# Patient Record
Sex: MALE | Race: WHITE
[De-identification: names, ages, dates, MRNs, and addresses within clinical notes are randomized per-mention and may not be internally consistent; named-entity substitution may affect disease eponyms.]

---

## 2019-01-24 ENCOUNTER — HOSPITAL ENCOUNTER (INPATIENT)
Dept: HOSPITAL 62 - ER | Age: 74
LOS: 4 days | Discharge: HOME | DRG: 336 | End: 2019-01-28
Attending: SURGERY | Admitting: SURGERY
Payer: MEDICARE

## 2019-01-24 DIAGNOSIS — K56.699: Primary | ICD-10-CM

## 2019-01-24 DIAGNOSIS — K76.89: ICD-10-CM

## 2019-01-24 DIAGNOSIS — Z87.891: ICD-10-CM

## 2019-01-24 DIAGNOSIS — K80.20: ICD-10-CM

## 2019-01-24 DIAGNOSIS — Q43.3: ICD-10-CM

## 2019-01-24 DIAGNOSIS — I25.10: ICD-10-CM

## 2019-01-24 DIAGNOSIS — I10: ICD-10-CM

## 2019-01-24 DIAGNOSIS — K57.92: ICD-10-CM

## 2019-01-24 LAB
ABSOLUTE LYMPHOCYTES# (MANUAL): 0.5 10^3/UL (ref 0.5–4.7)
ABSOLUTE MONOCYTES # (MANUAL): 1.5 10^3/UL (ref 0.1–1.4)
ABSOLUTE NEUTROPHILS# (MANUAL): 11.5 10^3/UL (ref 1.7–8.2)
ADD MANUAL DIFF: YES
ALBUMIN SERPL-MCNC: 4.9 G/DL (ref 3.5–5)
ALP SERPL-CCNC: 142 U/L (ref 38–126)
ALT SERPL-CCNC: 36 U/L (ref 21–72)
ANION GAP SERPL CALC-SCNC: 10 MMOL/L (ref 5–19)
APPEARANCE UR: (no result)
APTT PPP: (no result) S
AST SERPL-CCNC: 40 U/L (ref 17–59)
BASOPHILS NFR BLD MANUAL: 0 % (ref 0–2)
BILIRUB DIRECT SERPL-MCNC: 0.3 MG/DL (ref 0–0.4)
BILIRUB SERPL-MCNC: 0.8 MG/DL (ref 0.2–1.3)
BILIRUB UR QL STRIP: NEGATIVE
BUN SERPL-MCNC: 16 MG/DL (ref 7–20)
CALCIUM: 10.7 MG/DL (ref 8.4–10.2)
CHLORIDE SERPL-SCNC: 102 MMOL/L (ref 98–107)
CK MB SERPL-MCNC: 2.71 NG/ML (ref ?–4.55)
CK MB SERPL-MCNC: 2.99 NG/ML (ref ?–4.55)
CK SERPL-CCNC: 350 U/L (ref 55–170)
CO2 SERPL-SCNC: 27 MMOL/L (ref 22–30)
EOSINOPHIL NFR BLD MANUAL: 0 % (ref 0–6)
ERYTHROCYTE [DISTWIDTH] IN BLOOD BY AUTOMATED COUNT: 13.2 % (ref 11.5–14)
GLUCOSE SERPL-MCNC: 137 MG/DL (ref 75–110)
GLUCOSE UR STRIP-MCNC: NEGATIVE MG/DL
HCT VFR BLD CALC: 43.8 % (ref 37.9–51)
HGB BLD-MCNC: 15.3 G/DL (ref 13.5–17)
KETONES UR STRIP-MCNC: 80 MG/DL
LIPASE SERPL-CCNC: 58.3 U/L (ref 23–300)
MCH RBC QN AUTO: 32.5 PG (ref 27–33.4)
MCHC RBC AUTO-ENTMCNC: 34.9 G/DL (ref 32–36)
MCV RBC AUTO: 93 FL (ref 80–97)
MONOCYTES % (MANUAL): 11 % (ref 3–13)
NEUTS BAND NFR BLD MANUAL: 1 % (ref 3–5)
NITRITE UR QL STRIP: NEGATIVE
OVALOCYTES BLD QL SMEAR: SLIGHT
PH UR STRIP: 7 [PH] (ref 5–9)
PLATELET # BLD: 240 10^3/UL (ref 150–450)
PLATELET COMMENT: ADEQUATE
POIKILOCYTOSIS BLD QL SMEAR: SLIGHT
POTASSIUM SERPL-SCNC: 4.3 MMOL/L (ref 3.6–5)
PROT SERPL-MCNC: 7.2 G/DL (ref 6.3–8.2)
PROT UR STRIP-MCNC: 100 MG/DL
RBC # BLD AUTO: 4.7 10^6/UL (ref 4.35–5.55)
SEGMENTED NEUTROPHILS % (MAN): 84 % (ref 42–78)
SODIUM SERPL-SCNC: 139.4 MMOL/L (ref 137–145)
SP GR UR STRIP: 1.02
TOTAL CELLS COUNTED BLD: 100
TROPONIN I SERPL-MCNC: < 0.012 NG/ML
TROPONIN I SERPL-MCNC: < 0.012 NG/ML
UROBILINOGEN UR-MCNC: 2 MG/DL (ref ?–2)
VARIANT LYMPHS NFR BLD MANUAL: 2 % (ref 13–45)
WBC # BLD AUTO: 13.5 10^3/UL (ref 4–10.5)

## 2019-01-24 PROCEDURE — 93005 ELECTROCARDIOGRAM TRACING: CPT

## 2019-01-24 PROCEDURE — 84484 ASSAY OF TROPONIN QUANT: CPT

## 2019-01-24 PROCEDURE — 74250 X-RAY XM SM INT 1CNTRST STD: CPT

## 2019-01-24 PROCEDURE — 96361 HYDRATE IV INFUSION ADD-ON: CPT

## 2019-01-24 PROCEDURE — 82550 ASSAY OF CK (CPK): CPT

## 2019-01-24 PROCEDURE — 82962 GLUCOSE BLOOD TEST: CPT

## 2019-01-24 PROCEDURE — 96368 THER/DIAG CONCURRENT INF: CPT

## 2019-01-24 PROCEDURE — 80048 BASIC METABOLIC PNL TOTAL CA: CPT

## 2019-01-24 PROCEDURE — 71046 X-RAY EXAM CHEST 2 VIEWS: CPT

## 2019-01-24 PROCEDURE — 74018 RADEX ABDOMEN 1 VIEW: CPT

## 2019-01-24 PROCEDURE — 83605 ASSAY OF LACTIC ACID: CPT

## 2019-01-24 PROCEDURE — 94799 UNLISTED PULMONARY SVC/PX: CPT

## 2019-01-24 PROCEDURE — 99285 EMERGENCY DEPT VISIT HI MDM: CPT

## 2019-01-24 PROCEDURE — 87040 BLOOD CULTURE FOR BACTERIA: CPT

## 2019-01-24 PROCEDURE — 82553 CREATINE MB FRACTION: CPT

## 2019-01-24 PROCEDURE — 85025 COMPLETE CBC W/AUTO DIFF WBC: CPT

## 2019-01-24 PROCEDURE — 36415 COLL VENOUS BLD VENIPUNCTURE: CPT

## 2019-01-24 PROCEDURE — 81001 URINALYSIS AUTO W/SCOPE: CPT

## 2019-01-24 PROCEDURE — 96365 THER/PROPH/DIAG IV INF INIT: CPT

## 2019-01-24 PROCEDURE — 83690 ASSAY OF LIPASE: CPT

## 2019-01-24 PROCEDURE — 70450 CT HEAD/BRAIN W/O DYE: CPT

## 2019-01-24 PROCEDURE — S0028 INJECTION, FAMOTIDINE, 20 MG: HCPCS

## 2019-01-24 PROCEDURE — 80053 COMPREHEN METABOLIC PANEL: CPT

## 2019-01-24 PROCEDURE — 96375 TX/PRO/DX INJ NEW DRUG ADDON: CPT

## 2019-01-24 PROCEDURE — 88304 TISSUE EXAM BY PATHOLOGIST: CPT

## 2019-01-24 PROCEDURE — 74177 CT ABD & PELVIS W/CONTRAST: CPT

## 2019-01-24 PROCEDURE — 93010 ELECTROCARDIOGRAM REPORT: CPT

## 2019-01-24 RX ADMIN — FAMOTIDINE SCH MG: 10 INJECTION INTRAVENOUS at 22:10

## 2019-01-24 RX ADMIN — POTASSIUM CHLORIDE AND SODIUM CHLORIDE PRN MLS/HR: 450; 150 INJECTION, SOLUTION INTRAVENOUS at 22:53

## 2019-01-24 RX ADMIN — HEPARIN SODIUM SCH: 5000 INJECTION, SOLUTION INTRAVENOUS; SUBCUTANEOUS at 22:07

## 2019-01-24 RX ADMIN — DEXAMETHASONE SODIUM PHOSPHATE PRN MG: 10 INJECTION INTRAMUSCULAR; INTRAVENOUS at 23:09

## 2019-01-24 NOTE — RADIOLOGY REPORT (SQ)
EXAM DESCRIPTION:  CT HEAD WITHOUT



COMPLETED DATE/TIME:  1/24/2019 6:02 pm



REASON FOR STUDY:  syncopal event



COMPARISON:  None.



TECHNIQUE:  Axial images acquired through the brain without intravenous contrast.  Images reviewed wi
th bone, brain and subdural windows.  Additional sagittal and coronal reconstructions were generated.
 Images stored on PACS.

All CT scanners at this facility use dose modulation, iterative reconstruction, and/or weight based d
osing when appropriate to reduce radiation dose to as low as reasonably achievable (ALARA).

[facility]: Dose Right  [facility]: CareDose    [facility]: Dose Right    [facility]: Teradose 4D    [facility]: Smart [employer]



RADIATION DOSE:  CT Rad equipment meets quality standard of care and radiation dose reduction techniq
ues were employed. CTDIvol: 53.2 mGy. DLP: 1070 mGy-cm. mGy.



LIMITATIONS:  None.



FINDINGS:  VENTRICLES: Normal size and contour.

CEREBRUM: No masses.  No hemorrhage.  No midline shift.  No evidence for acute infarction. Normal gra
y/white matter differentiation. No areas of low density in the white matter.

CEREBELLUM: No masses.  No hemorrhage.  No alteration of density.  No evidence for acute infarction.

EXTRAAXIAL SPACES: No fluid collections.  No masses.

ORBITS AND GLOBE: No intra- or extraconal masses.  Normal contour of globe without masses.

CALVARIUM: No fracture.

PARANASAL SINUSES: No fluid or mucosal thickening.

SOFT TISSUES: No mass or hematoma.

OTHER: No other significant finding.



IMPRESSION:  NORMAL BRAIN CT WITHOUT CONTRAST.

EVIDENCE OF ACUTE STROKE: NO.



COMMENT:  Quality ID # 436: Final reports with documentation of one or more dose reduction techniques
 (e.g., Automated exposure control, adjustment of the mA and/or kV according to patient size, use of 
iterative reconstruction technique)



TECHNICAL DOCUMENTATION:  JOB ID:  [id number]

 2011 [employer]- All Rights Reserved



Reading location - IP/workstation name: [email or web address]

## 2019-01-24 NOTE — ER DOCUMENT REPORT
ED General





- General


Chief Complaint: Abdominal Pain


Stated Complaint: ABDOMINAL PAIN


Time Seen by Provider: 01/24/19 16:00


TRAVEL OUTSIDE OF THE [place] IN LAST 30 DAYS: No





- HPI


Notes: 





73-year-old male presents to ED with complaints of left upper quadrant, 

epigastric abdominal pain that started this morning with vomiting, patient 

states he felt lightheaded and passed out for "a few seconds" while in the 

waiting room.  Denies any fevers or chills, heart pain or shortness of breath, 

denies any new foods, medicines or travel.  Patient states he does have a 

history of diverticulitis, had a colonoscopy last year which was unremarkable.  

His not had anything to eat or drink since last night.  Reports symptoms are 

progressive was brought by son for evaluation.Denies fevers, chills,  chest 

pain,palpitations,  shortness of breath, dyspnea, nausea, hematuria,blurred 

vision, double vision, loss of vision, speech changes, LH, dizziness, syncope, 

headaches, wheezing, ST, URI, neck pain, weakness, bowel or bladder dysfunction,

saddle anesthesia, numbness or tingling in bilateral upper or lower extremities 

equally, muscle paralysis, weakness in bilateral upper or lower extremities 

equally or rash. 





- Related Data


Allergies/Adverse Reactions: 


                                        





No Known Allergies Allergy (Unverified 01/24/19 15:42)


   











Past Medical History





- General


Information source: Patient





- Social History


Smoking Status: Former Smoker


Frequency of alcohol use: Occasional


Drug Abuse: None


Family History: Reviewed & Not Pertinent


Patient has suicidal ideation: No


Patient has homicidal ideation: No





- Past Medical History


Cardiac Medical History: Reports: Hx Hypertension


Renal/ Medical History: Denies: Hx Peritoneal Dialysis


Past Surgical History: Reports: Hx Cardiac Surgery - Stent, Hx Orthopedic 

Surgery - trigger finger, Hx Tonsillectomy





Review of Systems





- Review of Systems


Constitutional: See HPI


EENT: No symptoms reported


Cardiovascular: No symptoms reported


Respiratory: No symptoms reported


Gastrointestinal: See HPI


Genitourinary: No symptoms reported


Male Genitourinary: No symptoms reported


Musculoskeletal: No symptoms reported


Skin: No symptoms reported


Hematologic/Lymphatic: No symptoms reported


Neurological/Psychological: No symptoms reported





Physical Exam





- Vital signs


Vitals: 


                                        











Temp Pulse Resp BP


 


 97.5 F   49 L  18   96/47 L


 


 01/24/19 15:41  01/24/19 15:41  01/24/19 15:41  01/24/19 15:41














- Notes


Notes: 





PHYSICAL EXAMINATION:





GENERAL: Well-appearing, well-nourished and in no acute distress.





HEAD: Atraumatic, normocephalic.





EYES: Pupils equal round and reactive to light, extraocular movements intact, 

sclera anicteric, conjunctiva are normal.





ENT: Nares patent, oropharynx clear without exudates.  Moist mucous membranes.





NECK: Normal range of motion, supple without lymphadenopathy





LUNGS: Breath sounds clear to auscultation bilaterally and equal.  No wheezes 

rales or rhonchi.





HEART: Regular rate and rhythm without murmurs





ABDOMEN: Soft, epigastric tenderness, left upper quadrant tenderness, 

nondistended abdomen.  No guarding, no rebound.  No masses appreciated.


No CVA tenderness to palpation


Musculoskeletal: Normal range of motion, no pitting or edema.  No cyanosis.





NEUROLOGICAL: Cranial nerves grossly intact.  Normal speech, normal gait.  

Normal sensory, motor exams 





PSYCH: Normal mood, normal affect.





SKIN: Warm, Dry, normal turgor, no rashes or lesions noted.





Course





- Re-evaluation


Re-evalutation: 





01/24/19 18:53


73-year-old male afebrile vitals stable and in mild distress due to pain 

presents to the ED for abdominal pain, left upper quadrant abdominal pain with 

nausea and vomiting, patient states he did have a witnessed syncopal event while

in the waiting room when he got up from a seated position when his name was 

called for evaluation by nurse, CT of head was negative for acute findings, this

likely was due to vasovagal response.  Patient has no focal neurological 

deficits.  CBC shows a WBC of 13.5 with slight shift, , no heat hepatic or

renal dysfunction, electrolytes stable, urinalysis does show ketones 

proteinuria.  CT abdomen pelvis with IV contrast does show the patient has mild 

diverticulitis of left lower quadrant as well as  malrotation with bowel 

obstruction dilated small bowel with mechanical. NG tube ordered patient has 

remained n.p.o.., IV ciprofloxacin as well as Flagyl initiated.  Reevaluation 

patient states he is having more abdominal pain, IV Dilaudid given with good 

results.  Consulted Dr. Dmitriy Mckeon, surgeon on call at 1845, at bedside at 

1900, will admit to medical sevice and pending possible surgery. 








- Vital Signs


Vital signs: 


                                        











Temp Pulse Resp BP Pulse Ox


 


 97.5 F   76   18   150/63 H   


 


 01/24/19 15:41  01/24/19 18:50  01/24/19 15:41  01/24/19 18:50   














- Laboratory


Result Diagrams: 


                                 01/24/19 16:03





                                 01/24/19 16:03


Laboratory results interpreted by me: 


                                        











  01/24/19 01/24/19 01/24/19





  16:03 16:03 16:08


 


WBC  13.5 H  


 


Seg Neuts % (Manual)  84 H  


 


Band Neutrophils %  1 L  


 


Lymphocytes % (Manual)  2 L  


 


Abs Neuts (Manual)  11.5 H  


 


Abs Monocytes (Manual)  1.5 H  


 


Glucose   137 H 


 


POC Glucose    124 H


 


Calcium   10.7 H 


 


Alkaline Phosphatase   142 H 


 


Creatine Kinase   350 H 


 


Urine Protein   


 


Urine Ketones   


 


Urine Urobilinogen   














  01/24/19





  16:30


 


WBC 


 


Seg Neuts % (Manual) 


 


Band Neutrophils % 


 


Lymphocytes % (Manual) 


 


Abs Neuts (Manual) 


 


Abs Monocytes (Manual) 


 


Glucose 


 


POC Glucose 


 


Calcium 


 


Alkaline Phosphatase 


 


Creatine Kinase 


 


Urine Protein  100 H


 


Urine Ketones  80 H


 


Urine Urobilinogen  2.0 H














Discharge





- Discharge


Clinical Impression: 


 maloration, Small bowel obstruction, Diverticulitis





Admitting Provider: Hospitalist - Dr. Dmitriy Contreras


Unit Admitted: Surgical Floor

## 2019-01-24 NOTE — RADIOLOGY REPORT (SQ)
EXAM DESCRIPTION: 



XR ABDOMEN 1 VIEW (KUB)



COMPLETED DATE/TME:  01/24/2019 00:00



CLINICAL HISTORY: 



73 years, Male, NGtube placement



COMPARISON:

CT from today's date



NUMBER OF VIEWS:

1



TECHNIQUE:

Supine portable abdomen



LIMITATIONS:

None.



FINDINGS:



Several mildly dilated air-filled loops of small bowel are

present. There is gas and stool in the colon. Incomplete

obstruction is not excluded. Enteric tube with the distal tip in

the proximal stomach. The sidehole is near the GE junction.

Advancement might be of benefit. Evaluation for free air limited

on a supine view. Osteopenia



IMPRESSION:



Tip of the enteric tube likely in the proximal stomach. Slight

advancement may be of benefit, as above.



Mildly dilated air-filled loops of small bowel for which partial

or incomplete obstruction is not excluded

 



copyright 2011 [employer]- All Rights Reserved

## 2019-01-24 NOTE — PDOC H&P
History of Present Illness


Admission Date/PCP: 


1/14/19





Patient complains of: abdominal pain,nausea,vomiting jpcif3lb.  had large 

mexician meal yesterday evening.  has hx of diverticulitis


History of Present Illness: 


DONNA ZAVALA is a 73 year old male








Past Medical History


Cardiac Medical History: Reports: Coronary Artery Disease, Hypertension


Pulmonary Medical History: Reports: None


GI Medical History: Reports: Diverticulitis





Past Surgical History


Past Surgical History: Reports: Orthopedic Surgery - trigger finger, 

Tonsillectomy





Social History


Smoking Status: Former Smoker





Family History


Parental Family History Reviewed: No


Children Family History Reviewed: Unknown


Sibling(s) Family History Reviewed.: Unknown





Medication/Allergy


Allergies/Adverse Reactions: 


                                        





No Known Allergies Allergy (Unverified 01/24/19 15:42)


   











Physical Exam


Vital Signs: 


                                        











Temp Pulse Resp BP Pulse Ox


 


 97.5 F   76   18   150/63 H   


 


 01/24/19 15:41  01/24/19 18:50  01/24/19 15:41  01/24/19 18:50   








                                 Intake & Output











 01/23/19 01/24/19 01/25/19





 06:59 06:59 06:59


 


Intake Total   1000


 


Balance   1000


 


Weight   89.2 kg











General appearance: PRESENT: mild distress


Head exam: PRESENT: atraumatic


Eye exam: PRESENT: conjunctiva pink


Respiratory exam: PRESENT: clear to auscultation chapo, unlabored


Cardiovascular exam: PRESENT: RRR


Pulses: PRESENT: normal carotid pulses, normal radial pulses, normal femoral 

pulses


Vascular exam: PRESENT: normal capillary refill


GI/Abdominal exam: PRESENT: soft, tenderness - soft, but tender to deep 

palpation now rebound tenderness + bs


Rectal exam: PRESENT: deferred


Extremities exam: PRESENT: full ROM


Musculoskeletal exam: PRESENT: ambulatory, full ROM


Skin exam: PRESENT: dry





Results


Laboratory Results: 


                                        





                                 01/24/19 16:03 





                                 01/24/19 16:03 





                                        











  01/24/19 01/24/19 01/24/19





  16:03 16:03 16:30


 


WBC  13.5 H  


 


RBC  4.70  


 


Hgb  15.3  


 


Hct  43.8  


 


MCV  93  


 


MCH  32.5  


 


MCHC  34.9  


 


RDW  13.2  


 


Plt Count  240  


 


Seg Neutrophils %  Not Reportable  


 


Lymphocytes %  Not Reportable  


 


Monocytes %  Not Reportable  


 


Eosinophils %  Not Reportable  


 


Basophils %  Not Reportable  


 


Absolute Neutrophils  Not Reportable  


 


Absolute Lymphocytes  Not Reportable  


 


Absolute Monocytes  Not Reportable  


 


Absolute Eosinophils  Not Reportable  


 


Absolute Basophils  Not Reportable  


 


Sodium   139.4 


 


Potassium   4.3 


 


Chloride   102 


 


Carbon Dioxide   27 


 


Anion Gap   10 


 


BUN   16 


 


Creatinine   0.81 


 


Est GFR ( Amer)   > 60 


 


Est GFR (Non-Af Amer)   > 60 


 


Glucose   137 H 


 


Calcium   10.7 H 


 


Total Bilirubin   0.8 


 


AST   40 


 


ALT   36 


 


Alkaline Phosphatase   142 H 


 


Total Protein   7.2 


 


Albumin   4.9 


 


Lipase   58.3 


 


Urine Color    JM


 


Urine Appearance    CLOUDY


 


Urine pH    7.0


 


Ur Specific Gravity    1.020


 


Urine Protein    100 H


 


Urine Glucose (UA)    NEGATIVE


 


Urine Ketones    80 H


 


Urine Blood    NEGATIVE


 


Urine Nitrite    NEGATIVE


 


Ur Leukocyte Esterase    NEGATIVE


 


Urine WBC (Auto)    3


 


Urine RBC (Auto)    1








                                        











  01/24/19 01/24/19





  16:03 16:03


 


Creatine Kinase  350 H 


 


CK-MB (CK-2)   2.99


 


Troponin I   < 0.012











Impressions: 


                                        





Chest X-Ray  01/24/19 16:06


IMPRESSION:  NO ACUTE RADIOGRAPHIC FINDING IN THE CHEST.


THERE ARE DILATED LOOPS OF SMALL BOWEL IN THE UPPER ABDOMEN WITH AIR-FLUID 

LEVELS.  PATIENT IS SCHEDULED FOR CT ABDOMEN AND PELVIS.


 








Head CT  01/24/19 16:08


IMPRESSION:  NORMAL BRAIN CT WITHOUT CONTRAST.


EVIDENCE OF ACUTE STROKE: NO.


 








Abdomen/Pelvis CT  01/24/19 16:29


IMPRESSION:


1. FOCAL AREA OF MILD DIVERTICULITIS IN THE LEFT LOWER QUADRANT.  NO EVIDENCE OF

ABSCESS.


2. BOWEL MALROTATION.  THE DUODENUM EXTENDS INFERIORLY INTO THE RIGHT SIDE OF 

THE ABDOMEN INSTEAD OF CROSSING TO THE LEFT UPPER QUADRANT.  THERE IS ALSO 

DIFFUSELY DILATED SMALL BOWEL CONSISTENT WITH MECHANICAL DISTAL SMALL BOWEL 

OBSTRUCTION.  NO FOCAL TRANSITION POINT VISUALIZED.  THIS APPEARS TO BE 

UNRELATED TO THE FINDINGS OF DIVERTICULITIS IN THE LEFT LOWER QUADRANT.


3. GALLSTONES.


4. HEPATIC CYSTS.


5. NO OTHER SIGNIFICANT OR ACUTE FINDING IN THE ABDOMEN OR PELVIS ON CT SCAN 

WITH IV CONTRAST.


 











Status: Imported from PACS - reviewed ct sbo, cholelithiasis diverticulitis





Assessment & Plan





- Plan Summary


Plan Summary: 





pt with hx of diverticulitis


presents with new onset of diverticuiltis and


small bowel obstruction


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


his sbo most likely due to loop of small bowel


adhesed to sigmoid colon, however could be related to


malrotation


I have offered him diagnotic laparosocpy with


lyis of adhesion and possible ladds procedure for the


malrotation.


however he understands that he may need a sigmoid colectomy


and colostomy if sever diverticulitis noted at time of surgery


alternatively he could be treated expectantly with ng suction and


observation and abx.


he would like to try the ng first


I explained that if his pain is not resolved iwth ng within the next


\couple of hrs, he would need surgery.


he agrees to the plan

## 2019-01-24 NOTE — RADIOLOGY REPORT (SQ)
EXAM DESCRIPTION:  CT ABD/PELVIS WITH IV ONLY



COMPLETED DATE/TIME:  1/24/2019 6:02 pm



REASON FOR STUDY:  epigastric/LLQ pain



COMPARISON:  None.



TECHNIQUE:  CT scan of the abdomen and pelvis performed using helical scanning technique with dynamic
 intravenous contrast injection.  No oral contrast. Images reviewed with lung, soft tissue, and bone 
windows. Reconstructed coronal and sagittal MPR images reviewed. Delayed images for evaluation of the
 urinary system also acquired. All images stored on PACS.

All CT scanners at this facility use dose modulation, iterative reconstruction, and/or weight based d
osing when appropriate to reduce radiation dose to as low as reasonably achievable (ALARA).

[facility]: Dose Right  [facility]: CareDose    [facility]: Dose Right    [facility]: Teradose 4D    [facility]: Smart [employer]



CONTRAST TYPE AND DOSE:  contrast/concentration: Isovue 350.00 mg/ml; Total Contrast Delivered: 100.0
 ml; Total Saline Delivered: 72.0 ml



RENAL FUNCTION:  BUN 16 creatinine 0.81.



RADIATION DOSE:  CT Rad equipment meets quality standard of care and radiation dose reduction techniq
ues were employed. CTDIvol: 9.6 - 14.4 mGy. DLP: 1418 mGy-cm..



LIMITATIONS:  None.



FINDINGS:  LOWER CHEST: No significant findings. No nodules or infiltrates.

LIVER: Normal size.  Several cysts, the largest in the right lobe measuring 3 x 5 cm.  No solid barb
s.  No dilated ducts.

SPLEEN: Normal size.  Small subcentimeter cyst.  No other focal lesions.

PANCREAS: No masses. No significant calcifications. No adjacent inflammation or peripancreatic fluid 
collections. Pancreatic duct not dilated.

GALLBLADDER: Gallstones. No inflammatory changes to suggest cholecystitis.

ADRENAL GLANDS: No significant masses or asymmetry.

RIGHT KIDNEY AND URETER: No solid masses.   No significant calcifications.   No hydronephrosis or hyd
roureter.

LEFT KIDNEY AND URETER: No solid masses.   No significant calcifications.   No hydronephrosis or hydr
oureter.

AORTA AND VESSELS: No aneurysm. No dissection. Renal arteries, SMA, celiac without stenosis.

RETROPERITONEUM: No retroperitoneal adenopathy, hemorrhage or masses.

BOWEL AND PERITONEAL CAVITY: Bowel malrotation.  The duodenum extends inferiorly into the right abdom
en instead of crossing to the left upper quadrant.  Diffusely dilated fluid filled small bowel.   No 
focal transition point identified.  Diverticuli particularly in the descending and sigmoid colon.  Mi
ld focal inflammation in the pericolonic tissues of the left lower quadrant.  No evidence of focal ab
scess.  Small amount of free fluid.

APPENDIX: Not visualized.

PELVIS: No mass.  Small amount of free fluid. Normal bladder.

ABDOMINAL WALL: No masses. No hernias.

BONES: No significant or acute findings.

OTHER: No other significant finding.



IMPRESSION:

1. FOCAL AREA OF MILD DIVERTICULITIS IN THE LEFT LOWER QUADRANT.  NO EVIDENCE OF ABSCESS.

2. BOWEL MALROTATION.  THE DUODENUM EXTENDS INFERIORLY INTO THE RIGHT SIDE OF THE ABDOMEN INSTEAD OF 
CROSSING TO THE LEFT UPPER QUADRANT.  THERE IS ALSO DIFFUSELY DILATED SMALL BOWEL CONSISTENT WITH MEC
HANICAL DISTAL SMALL BOWEL OBSTRUCTION.  NO FOCAL TRANSITION POINT VISUALIZED.  THIS APPEARS TO BE UN
RELATED TO THE FINDINGS OF DIVERTICULITIS IN THE LEFT LOWER QUADRANT.

3. GALLSTONES.

4. HEPATIC CYSTS.

5. NO OTHER SIGNIFICANT OR ACUTE FINDING IN THE ABDOMEN OR PELVIS ON CT SCAN WITH IV CONTRAST.



TECHNICAL DOCUMENTATION:  JOB ID:  [id number]

Quality ID # 436: Final reports with documentation of one or more dose reduction techniques (e.g., Au
tomated exposure control, adjustment of the mA and/or kV according to patient size, use of iterative 
reconstruction technique)

 2011 [employer]- All Rights Reserved



Reading location - IP/workstation name: [email or web address]

## 2019-01-24 NOTE — RADIOLOGY REPORT (SQ)
EXAM DESCRIPTION:  CHEST 2 VIEWS



COMPLETED DATE/TIME:  1/24/2019 4:33 pm



REASON FOR STUDY:  epigastric pain



COMPARISON:  None.



EXAM PARAMETERS:  NUMBER OF VIEWS: two views

TECHNIQUE: Digital Frontal and Lateral radiographic views of the chest acquired.

RADIATION DOSE: NA

LIMITATIONS: none



FINDINGS:  LUNGS AND PLEURA: No opacities, masses or pneumothorax. No pleural effusion.

MEDIASTINUM AND HILAR STRUCTURES: No masses or contour abnormalities.

HEART AND VASCULAR STRUCTURES: Heart normal size.  No evidence for failure.

BONES: No acute findings.

HARDWARE: None in the chest.

OTHER: Dilated small bowel loops in the upper abdomen with air-fluid levels



IMPRESSION:  NO ACUTE RADIOGRAPHIC FINDING IN THE CHEST.

THERE ARE DILATED LOOPS OF SMALL BOWEL IN THE UPPER ABDOMEN WITH AIR-FLUID LEVELS.  PATIENT IS SCHEDU
LED FOR CT ABDOMEN AND PELVIS.



TECHNICAL DOCUMENTATION:  JOB ID:  [id number]

 2011 [employer]- All Rights Reserved



Reading location - IP/workstation name: [email or web address]

## 2019-01-25 LAB
%HYPO/RBC NFR BLD AUTO: SLIGHT %
ABSOLUTE LYMPHOCYTES# (MANUAL): 0.8 10^3/UL (ref 0.5–4.7)
ABSOLUTE MONOCYTES # (MANUAL): 0.7 10^3/UL (ref 0.1–1.4)
ABSOLUTE NEUTROPHILS# (MANUAL): 9.8 10^3/UL (ref 1.7–8.2)
ADD MANUAL DIFF: YES
ANION GAP SERPL CALC-SCNC: 8 MMOL/L (ref 5–19)
BASOPHILS NFR BLD MANUAL: 0 % (ref 0–2)
BUN SERPL-MCNC: 14 MG/DL (ref 7–20)
CALCIUM: 9.2 MG/DL (ref 8.4–10.2)
CHLORIDE SERPL-SCNC: 104 MMOL/L (ref 98–107)
CO2 SERPL-SCNC: 26 MMOL/L (ref 22–30)
EOSINOPHIL NFR BLD MANUAL: 0 % (ref 0–6)
ERYTHROCYTE [DISTWIDTH] IN BLOOD BY AUTOMATED COUNT: 13.2 % (ref 11.5–14)
GLUCOSE SERPL-MCNC: 130 MG/DL (ref 75–110)
HCT VFR BLD CALC: 43.2 % (ref 37.9–51)
HGB BLD-MCNC: 14.9 G/DL (ref 13.5–17)
MCH RBC QN AUTO: 32.3 PG (ref 27–33.4)
MCHC RBC AUTO-ENTMCNC: 34.4 G/DL (ref 32–36)
MCV RBC AUTO: 94 FL (ref 80–97)
MONOCYTES % (MANUAL): 6 % (ref 3–13)
PLATELET # BLD: 217 10^3/UL (ref 150–450)
PLATELET CLUMP BLD QL SMEAR: PRESENT
PLATELET LARGE: PRESENT
POTASSIUM SERPL-SCNC: 4.1 MMOL/L (ref 3.6–5)
RBC # BLD AUTO: 4.6 10^6/UL (ref 4.35–5.55)
SEGMENTED NEUTROPHILS % (MAN): 87 % (ref 42–78)
SODIUM SERPL-SCNC: 137.8 MMOL/L (ref 137–145)
TOTAL CELLS COUNTED BLD: 100
VARIANT LYMPHS NFR BLD MANUAL: 6 % (ref 13–45)
WBC # BLD AUTO: 11.3 10^3/UL (ref 4–10.5)

## 2019-01-25 PROCEDURE — 0DTJ4ZZ RESECTION OF APPENDIX, PERCUTANEOUS ENDOSCOPIC APPROACH: ICD-10-PCS | Performed by: SURGERY

## 2019-01-25 PROCEDURE — 0DNN4ZZ RELEASE SIGMOID COLON, PERCUTANEOUS ENDOSCOPIC APPROACH: ICD-10-PCS | Performed by: SURGERY

## 2019-01-25 RX ADMIN — DEXAMETHASONE SODIUM PHOSPHATE PRN MG: 10 INJECTION INTRAMUSCULAR; INTRAVENOUS at 07:13

## 2019-01-25 RX ADMIN — HEPARIN SODIUM SCH UNIT: 5000 INJECTION, SOLUTION INTRAVENOUS; SUBCUTANEOUS at 22:31

## 2019-01-25 RX ADMIN — POTASSIUM CHLORIDE AND SODIUM CHLORIDE PRN MLS/HR: 450; 150 INJECTION, SOLUTION INTRAVENOUS at 22:31

## 2019-01-25 RX ADMIN — CEFAZOLIN SCH MLS/HR: 1 INJECTION, POWDER, FOR SOLUTION INTRAVENOUS at 05:44

## 2019-01-25 RX ADMIN — METRONIDAZOLE SCH MLS/HR: 500 INJECTION, SOLUTION INTRAVENOUS at 09:06

## 2019-01-25 RX ADMIN — HEPARIN SODIUM SCH UNIT: 5000 INJECTION, SOLUTION INTRAVENOUS; SUBCUTANEOUS at 09:06

## 2019-01-25 RX ADMIN — FAMOTIDINE SCH MG: 10 INJECTION INTRAVENOUS at 22:30

## 2019-01-25 RX ADMIN — FAMOTIDINE SCH MG: 10 INJECTION INTRAVENOUS at 09:06

## 2019-01-25 RX ADMIN — METRONIDAZOLE SCH MLS/HR: 500 INJECTION, SOLUTION INTRAVENOUS at 15:07

## 2019-01-25 RX ADMIN — CEFAZOLIN SCH MLS/HR: 1 INJECTION, POWDER, FOR SOLUTION INTRAVENOUS at 11:47

## 2019-01-25 RX ADMIN — POTASSIUM CHLORIDE AND SODIUM CHLORIDE PRN MLS/HR: 450; 150 INJECTION, SOLUTION INTRAVENOUS at 09:14

## 2019-01-25 RX ADMIN — CEFAZOLIN SCH: 1 INJECTION, POWDER, FOR SOLUTION INTRAVENOUS at 20:46

## 2019-01-25 RX ADMIN — CEFAZOLIN SCH MLS/HR: 1 INJECTION, POWDER, FOR SOLUTION INTRAVENOUS at 00:30

## 2019-01-25 RX ADMIN — METRONIDAZOLE SCH MLS/HR: 500 INJECTION, SOLUTION INTRAVENOUS at 03:04

## 2019-01-25 NOTE — RADIOLOGY REPORT (SQ)
EXAM DESCRIPTION:  SMALL BOWEL SERIES



COMPLETED DATE/TIME:  1/25/2019 9:44 am



REASON FOR STUDY:  r/o sbo



COMPARISON:  CT abdomen pelvis 1/24/2019

KUB 1/24/2019



FLUOROSCOPY TIME:  No fluoroscopy was used

3 KUB images saved to PACS.



LIMITATIONS:  None.



PROCEDURE:  Initial  image of abdomen acquired, followed by administration of Gastrografin oral 
contrast.  Serial radiographic images acquired.  Fluoroscopic images recorded of the terminal ileum a
nd other indicated areas.  All images stored on PACS.



FINDINGS:   KUB demonstrates a distended urinary bladder with radiodense urine from prior CT IV 
contrast 1/24/2019.  There are persistent dilated upper abdominal small bowel loops with air-fluid le
vels.  Minimal gas and stool in the colon.  Nasogastric tube tip in the stomach, side port at the GE 
junction.  Stomach decompressed.  Degenerative changes lumbar spine.

Gastrografin was instilled through the patient's nasogastric tube.  Prompt gastric emptying from a no
ndistended stomach into a nondistended duodenum to the right of the spine.  Jejunum is nondilated, in
 the right upper quadrant correlating with for foregut malrotation on CT abdomen pelvis 1/24/2019.

Just beyond the proximal jejunal nondilated loops, there is a dilated jejunal loop with air-fluid lev
el, and there are distal jejunal loops in the mid epigastrium which are dilated with air-fluid levels
.

At this point, patient was vomiting and was unable to tolerate any further oral contrast.  Results ca
lled to Dr. Perkins



IMPRESSION:  Dilated small bowel loops from small bowel obstruction.  Foregut malrotation.  Findings 
called to the attending surgeon, Dr. Perkins 1000 hours 1/25/2019.



COMMENT:  Quality :  Final reports for procedures using fluoroscopy that document radiation exp
osure indices, or exposure time and number of fluorographic images (if radiation exposure indices are
 not available)



TECHNICAL DOCUMENTATION:  JOB ID:  [id number]

 2011 [employer]- All Rights Reserved



Reading location - IP/workstation name: [email or web address]

## 2019-01-25 NOTE — EKG REPORT
SEVERITY:- BORDERLINE ECG -

SINUS RHYTHM

BORDERLINE T ABNORMALITIES, INFERIOR LEADS

:

Confirmed by: Kayleigh Alexander MD 25-Jan-2019 22:56:29

## 2019-01-25 NOTE — OPERATIVE REPORT
Operative Report


DATE OF SURGERY: 01/25/19


PREOPERATIVE DIAGNOSIS: small bowel obstruction, diverticulitis.


POSTOPERATIVE DIAGNOSIS: small bowel obstruction


OPERATION: diagnostic laparoscopy, lysis of adhesions, appendectomy


SURGEON: DARREN OBRIEN


1ST ASSISTANT: VIOLA WATTS


ANESTHESIA: GA


TISSUE REMOVED OR ALTERED: appendix


COMPLICATIONS: 





none


ESTIMATED BLOOD LOSS: 25cc


PROCEDURE: 





see dictation

## 2019-01-25 NOTE — PDOC PROGRESS REPORT
Subjective


Progress Note for:: 01/25/19


Reason For Visit: 


SMALL BOWEL OBSTRUCTION








Physical Exam


Vital Signs: 


                                        











Temp Pulse Resp BP Pulse Ox


 


 98.6 F   72   16   158/61 H  95 


 


 01/25/19 03:43  01/25/19 03:43  01/25/19 03:43  01/25/19 03:43  01/25/19 03:43








                                 Intake & Output











 01/23/19 01/24/19 01/25/19





 06:59 06:59 06:59


 


Intake Total   2400


 


Output Total   400


 


Balance   2000


 


Weight   85.5 kg











GI/Abdominal exam: PRESENT: soft - soft non tender few bs





Results


Laboratory Results: 


                                        





                                 01/24/19 16:03 





                                 01/24/19 16:03 





                                        











  01/24/19 01/24/19 01/24/19





  16:03 16:03 16:30


 


WBC  13.5 H  


 


RBC  4.70  


 


Hgb  15.3  


 


Hct  43.8  


 


MCV  93  


 


MCH  32.5  


 


MCHC  34.9  


 


RDW  13.2  


 


Plt Count  240  


 


Seg Neutrophils %  Not Reportable  


 


Lymphocytes %  Not Reportable  


 


Monocytes %  Not Reportable  


 


Eosinophils %  Not Reportable  


 


Basophils %  Not Reportable  


 


Absolute Neutrophils  Not Reportable  


 


Absolute Lymphocytes  Not Reportable  


 


Absolute Monocytes  Not Reportable  


 


Absolute Eosinophils  Not Reportable  


 


Absolute Basophils  Not Reportable  


 


Sodium   139.4 


 


Potassium   4.3 


 


Chloride   102 


 


Carbon Dioxide   27 


 


Anion Gap   10 


 


BUN   16 


 


Creatinine   0.81 


 


Est GFR ( Amer)   > 60 


 


Est GFR (Non-Af Amer)   > 60 


 


Glucose   137 H 


 


Lactic Acid   


 


Calcium   10.7 H 


 


Total Bilirubin   0.8 


 


AST   40 


 


ALT   36 


 


Alkaline Phosphatase   142 H 


 


Total Protein   7.2 


 


Albumin   4.9 


 


Lipase   58.3 


 


Urine Color    JM


 


Urine Appearance    CLOUDY


 


Urine pH    7.0


 


Ur Specific Gravity    1.020


 


Urine Protein    100 H


 


Urine Glucose (UA)    NEGATIVE


 


Urine Ketones    80 H


 


Urine Blood    NEGATIVE


 


Urine Nitrite    NEGATIVE


 


Ur Leukocyte Esterase    NEGATIVE


 


Urine WBC (Auto)    3


 


Urine RBC (Auto)    1














  01/24/19





  19:45


 


WBC 


 


RBC 


 


Hgb 


 


Hct 


 


MCV 


 


MCH 


 


MCHC 


 


RDW 


 


Plt Count 


 


Seg Neutrophils % 


 


Lymphocytes % 


 


Monocytes % 


 


Eosinophils % 


 


Basophils % 


 


Absolute Neutrophils 


 


Absolute Lymphocytes 


 


Absolute Monocytes 


 


Absolute Eosinophils 


 


Absolute Basophils 


 


Sodium 


 


Potassium 


 


Chloride 


 


Carbon Dioxide 


 


Anion Gap 


 


BUN 


 


Creatinine 


 


Est GFR ( Amer) 


 


Est GFR (Non-Af Amer) 


 


Glucose 


 


Lactic Acid  0.8


 


Calcium 


 


Total Bilirubin 


 


AST 


 


ALT 


 


Alkaline Phosphatase 


 


Total Protein 


 


Albumin 


 


Lipase 


 


Urine Color 


 


Urine Appearance 


 


Urine pH 


 


Ur Specific Gravity 


 


Urine Protein 


 


Urine Glucose (UA) 


 


Urine Ketones 


 


Urine Blood 


 


Urine Nitrite 


 


Ur Leukocyte Esterase 


 


Urine WBC (Auto) 


 


Urine RBC (Auto) 








                                        











  01/24/19 01/24/19 01/24/19





  16:03 16:03 19:45


 


Creatine Kinase  350 H   258 H


 


CK-MB (CK-2)   2.99 


 


Troponin I   < 0.012 














  01/24/19





  19:45


 


Creatine Kinase 


 


CK-MB (CK-2)  2.71


 


Troponin I  < 0.012











Impressions: 


                                        





KUB X-Ray  01/24/19 00:00


IMPRESSION:


 


Tip of the enteric tube likely in the proximal stomach. Slight


advancement may be of benefit, as above.


 


Mildly dilated air-filled loops of small bowel for which partial


or incomplete obstruction is not excluded


 


 


copyright 2011 [employer]- All Rights Reserved


 








Chest X-Ray  01/24/19 16:06


IMPRESSION:  NO ACUTE RADIOGRAPHIC FINDING IN THE CHEST.


THERE ARE DILATED LOOPS OF SMALL BOWEL IN THE UPPER ABDOMEN WITH AIR-FLUID 

LEVELS.  PATIENT IS SCHEDULED FOR CT ABDOMEN AND PELVIS.


 








Head CT  01/24/19 16:08


IMPRESSION:  NORMAL BRAIN CT WITHOUT CONTRAST.


EVIDENCE OF ACUTE STROKE: NO.


 








Abdomen/Pelvis CT  01/24/19 16:29


IMPRESSION:


1. FOCAL AREA OF MILD DIVERTICULITIS IN THE LEFT LOWER QUADRANT.  NO EVIDENCE OF

ABSCESS.


2. BOWEL MALROTATION.  THE DUODENUM EXTENDS INFERIORLY INTO THE RIGHT SIDE OF 

THE ABDOMEN INSTEAD OF CROSSING TO THE LEFT UPPER QUADRANT.  THERE IS ALSO DI

FFUSELY DILATED SMALL BOWEL CONSISTENT WITH MECHANICAL DISTAL SMALL BOWEL 

OBSTRUCTION.  NO FOCAL TRANSITION POINT VISUALIZED.  THIS APPEARS TO BE 

UNRELATED TO THE FINDINGS OF DIVERTICULITIS IN THE LEFT LOWER QUADRANT.


3. GALLSTONES.


4. HEPATIC CYSTS.


5. NO OTHER SIGNIFICANT OR ACUTE FINDING IN THE ABDOMEN OR PELVIS ON CT SCAN 

WITH IV CONTRAST.


 














Assessment & Plan





- Inpatient Certification


Medical Necessity: Need Close Monitoring Due to Risk of Patient Decompensation, 

Need For IV Fluids





- Plan Summary


Plan Summary: 





small bowel series today


r/o malrotation/obstruction

## 2019-01-26 RX ADMIN — CEFAZOLIN SCH MLS/HR: 1 INJECTION, POWDER, FOR SOLUTION INTRAVENOUS at 12:10

## 2019-01-26 RX ADMIN — POTASSIUM CHLORIDE AND SODIUM CHLORIDE PRN MLS/HR: 450; 150 INJECTION, SOLUTION INTRAVENOUS at 10:22

## 2019-01-26 RX ADMIN — CEFAZOLIN SCH MLS/HR: 1 INJECTION, POWDER, FOR SOLUTION INTRAVENOUS at 17:21

## 2019-01-26 RX ADMIN — FAMOTIDINE SCH MG: 10 INJECTION INTRAVENOUS at 22:58

## 2019-01-26 RX ADMIN — HEPARIN SODIUM SCH UNIT: 5000 INJECTION, SOLUTION INTRAVENOUS; SUBCUTANEOUS at 22:59

## 2019-01-26 RX ADMIN — POTASSIUM CHLORIDE AND SODIUM CHLORIDE PRN MLS/HR: 450; 150 INJECTION, SOLUTION INTRAVENOUS at 17:23

## 2019-01-26 RX ADMIN — FAMOTIDINE SCH MG: 10 INJECTION INTRAVENOUS at 10:23

## 2019-01-26 RX ADMIN — CEFAZOLIN SCH MLS/HR: 1 INJECTION, POWDER, FOR SOLUTION INTRAVENOUS at 00:16

## 2019-01-26 RX ADMIN — CEFAZOLIN SCH MLS/HR: 1 INJECTION, POWDER, FOR SOLUTION INTRAVENOUS at 05:46

## 2019-01-26 RX ADMIN — HEPARIN SODIUM SCH UNIT: 5000 INJECTION, SOLUTION INTRAVENOUS; SUBCUTANEOUS at 10:22

## 2019-01-26 NOTE — OPERATIVE REPORT E
Operative Report



NAME: DONNA ZAVALA

MRN:  [id number]          : 1945 AGE:  73Y

DATE OF SURGERY: 2019               ROOM: 206



PREOPERATIVE DIAGNOSES:

1.  SMALL BOWEL OBSTRUCTION.

2.  DIVERTICULITIS.

3.  POSSIBLE MALROTATION.



POSTOPERATIVE DIAGNOSES:

1.  BOWEL OBSTRUCTION.

2.  DIVERTICULITIS.

3.  INCOMPLETE INTESTINAL ROTATION.



OPERATIVE PROCEDURE:

Diagnostic laparoscopy with lysis of adhesions and appendectomy.



SURGEON:

DRAREN OBRIEN M.D.



ASSISTANT:

Mata Perkins M.D.



INDICATION FOR PROCEDURE:

This is a 73-year-old male who presented to the emergency room last

evening with increased nausea and vomiting, abdominal pain.  A CT scan was

obtained which showed mild to moderate diverticulitis, a small bowel

obstruction, and possible malrotation of the small bowel.  NG tube was

placed and he underwent a small bowel series this morning which confirmed

a possible malrotation as well as a bowel obstruction.  He was, therefore,

brought to the operating for this procedure.



PROCEDURE IN DETAIL:

The patient was brought to the operating room awake, alert, in stable

condition.  Placed on the operating room table in supine position. 

Induced under general anesthesia, intubated.  The abdomen was prepped and

draped in the usual standard manner for the procedure.  A Veress needle

was placed into the umbilicus and the abdomen was insufflated with 6

liters of CO2 gas.  A supraumbilical 10 mm incision was made with a 12

blade and 10 mm port placed in the abdominal cavity.  Intraabdominal

visualization revealed no evidence of a Veress needle or trocar injury. 

Two right-sided abdominal 5 mm ports were placed under direct vision, 1

left-sided 5 mm port under direct vision.  We initially noted that there

was no evidence of a Veress needle or trocar injury, there was dilated

small bowel loops and I also noted that the cecum was in left lower

quadrant and most of the small bowel was in the right side of the

abdominal cavity and the colon appeared to be mostly on the left side.



We then identified a decompressed distal loop of small bowel near the

cecum and ran that proximally until we reached a point of fixation, and

there appeared to be a ton of omentum that was looped over that small

bowel and adhesed to the area on the sigmoid colon where the

diverticulitis was occurring.  I divided that with a ligature device and

released the small bowel.  I was then able to run the entire length of the

small bowel, decompressing the proximally dilated loops into the distal

decompressed loops as we went.  We ran that all the way up to the

duodenum.  There was no ligament of Treitz I could speak of.  The duodenum

was not dilated and the stomach was decompressed.  We ran the small bowel

again from the duodenal jejunal junction all the way to the terminal ileum

where we identified the fact that there was no injury to the serosa and

that it was at now decompressed.  I did not identify any Dhaval's bands to

speak of that should be released and this being consistent with an

incomplete rotation as opposed to a malrotation.



Once this was completed we turned attention to the appendix.  Since the

cecum was in the right lower quadrant I elected to perform an

appendectomy.  I took down the mesoappendix with a ligature device to the

base of the cecum and then came across the base of the appendix on the

cecum with 1 firing of the Endo GI stapler with a blue load and removed

the appendix through the umbilical port site.  The abdominal cavity was

copiously irrigated with normal saline and suctioned dry.  We ran the

small bowel a third time making sure that there was no injury to it, and

there was none.  We then reduced pneumoperitoneum.  I closed the umbilical

port site with 0 Vicryl and the fascia and then closed all the skin

incisions with a intraarticular of 4-0 Rapide.  Steri-Strips completed the

procedure.  Estimated blood was less than 25 mL.  Sponge and needle counts

were correct x2 and the patient was awakened in the operating room,

extubated, transferred to recovery in stable condition.  No complications.







DICTATING PHYSICIAN:  DARREN OBRIEN M.D.





[id number]                  DT: 2019    2346

PHY#: [id number]            DD: [id number]    [id number]

ID:   [id number]           JOB#: [id number]       ACCT: [id number]



cc:DARREN OBRIEN M.D.

>

## 2019-01-26 NOTE — PDOC PROGRESS REPORT
Subjective


Progress Note for:: 01/26/19


Reason For Visit: 


SMALL BOWEL OBSTRUCTION


pod 1 s/p laparoscopic lysis of adhesions





Physical Exam


Vital Signs: 


                                        











Temp Pulse Resp BP Pulse Ox


 


 98.3 F   70   18   148/66 H  97 


 


 01/26/19 07:36  01/26/19 07:36  01/26/19 07:36  01/26/19 07:36  01/26/19 07:36








                                 Intake & Output











 01/25/19 01/26/19 01/27/19





 06:59 06:59 06:59


 


Intake Total 3400 4550 1000


 


Output Total 400 2670 25


 


Balance 3000 1880 975


 


Weight 85.5 kg 85.7 kg 











GI/Abdominal exam: PRESENT: hypoactive bowel sounds, soft





Results


Laboratory Results: 


                                        





                                 01/25/19 11:15 





                                 01/25/19 11:15 





                                        











  01/25/19 01/25/19





  11:15 11:15


 


WBC  11.3 H 


 


RBC  4.60 


 


Hgb  14.9 


 


Hct  43.2 


 


MCV  94 


 


MCH  32.3 


 


MCHC  34.4 


 


RDW  13.2 


 


Plt Count  217 


 


Seg Neutrophils %  Not Reportable 


 


Lymphocytes %  Not Reportable 


 


Monocytes %  Not Reportable 


 


Eosinophils %  Not Reportable 


 


Basophils %  Not Reportable 


 


Absolute Neutrophils  Not Reportable 


 


Absolute Lymphocytes  Not Reportable 


 


Absolute Monocytes  Not Reportable 


 


Absolute Eosinophils  Not Reportable 


 


Absolute Basophils  Not Reportable 


 


Sodium   137.8


 


Potassium   4.1


 


Chloride   104


 


Carbon Dioxide   26


 


Anion Gap   8


 


BUN   14


 


Creatinine   0.84


 


Est GFR ( Amer)   > 60


 


Est GFR (Non-Af Amer)   > 60


 


Glucose   130 H


 


Calcium   9.2








                                        











  01/24/19 01/24/19 01/24/19





  16:03 16:03 19:45


 


Creatine Kinase  350 H   258 H


 


CK-MB (CK-2)   2.99 


 


Troponin I   < 0.012 














  01/24/19





  19:45


 


Creatine Kinase 


 


CK-MB (CK-2)  2.71


 


Troponin I  < 0.012











Impressions: 


                                        





KUB X-Ray  01/24/19 00:00


IMPRESSION:


 


Tip of the enteric tube likely in the proximal stomach. Slight


advancement may be of benefit, as above.


 


Mildly dilated air-filled loops of small bowel for which partial


or incomplete obstruction is not excluded


 


 


copyright 2011 [employer]- All Rights Reserved


 








Chest X-Ray  01/24/19 16:06


IMPRESSION:  NO ACUTE RADIOGRAPHIC FINDING IN THE CHEST.


THERE ARE DILATED LOOPS OF SMALL BOWEL IN THE UPPER ABDOMEN WITH AIR-FLUID 

LEVELS.  PATIENT IS SCHEDULED FOR CT ABDOMEN AND PELVIS.


 








Head CT  01/24/19 16:08


IMPRESSION:  NORMAL BRAIN CT WITHOUT CONTRAST.


EVIDENCE OF ACUTE STROKE: NO.


 








Abdomen/Pelvis CT  01/24/19 16:29


IMPRESSION:


1. FOCAL AREA OF MILD DIVERTICULITIS IN THE LEFT LOWER QUADRANT.  NO EVIDENCE OF

ABSCESS.


2. BOWEL MALROTATION.  THE DUODENUM EXTENDS INFERIORLY INTO THE RIGHT SIDE OF 

THE ABDOMEN INSTEAD OF CROSSING TO THE LEFT UPPER QUADRANT.  THERE IS ALSO 

DIFFUSELY DILATED SMALL BOWEL CONSISTENT WITH MECHANICAL DISTAL SMALL BOWEL 

OBSTRUCTION.  NO FOCAL TRANSITION POINT VISUALIZED.  THIS APPEARS TO BE UN

RELATED TO THE FINDINGS OF DIVERTICULITIS IN THE LEFT LOWER QUADRANT.


3. GALLSTONES.


4. HEPATIC CYSTS.


5. NO OTHER SIGNIFICANT OR ACUTE FINDING IN THE ABDOMEN OR PELVIS ON CT SCAN 

WITH IV CONTRAST.


 








Small Bowel X-Ray  01/25/19 08:00


IMPRESSION:  Dilated small bowel loops from small bowel obstruction.  Foregut 

malrotation.  Findings called to the attending surgeon, Dr. Perkins 1000 hours 

1/25/2019.


 














Assessment & Plan





- Inpatient Certification


Medical Necessity: Need For IV Fluids, Need for IV Antibiotics





- Plan Summary


Plan Summary: 





pt feels better this am


no flatus but abd feels much better


+ bs


plan clamp ng trial


remove colmenares


up ambulating 


cont iv abx for diverticulitis

## 2019-01-27 RX ADMIN — CEFAZOLIN SCH MLS/HR: 1 INJECTION, POWDER, FOR SOLUTION INTRAVENOUS at 00:44

## 2019-01-27 RX ADMIN — CEFAZOLIN SCH MLS/HR: 1 INJECTION, POWDER, FOR SOLUTION INTRAVENOUS at 12:10

## 2019-01-27 RX ADMIN — POTASSIUM CHLORIDE AND SODIUM CHLORIDE PRN MLS/HR: 450; 150 INJECTION, SOLUTION INTRAVENOUS at 22:02

## 2019-01-27 RX ADMIN — CEFAZOLIN SCH MLS/HR: 1 INJECTION, POWDER, FOR SOLUTION INTRAVENOUS at 17:25

## 2019-01-27 RX ADMIN — FAMOTIDINE SCH MG: 10 INJECTION INTRAVENOUS at 12:06

## 2019-01-27 RX ADMIN — CEFAZOLIN SCH MLS/HR: 1 INJECTION, POWDER, FOR SOLUTION INTRAVENOUS at 05:53

## 2019-01-27 RX ADMIN — HEPARIN SODIUM SCH UNIT: 5000 INJECTION, SOLUTION INTRAVENOUS; SUBCUTANEOUS at 09:59

## 2019-01-27 RX ADMIN — POTASSIUM CHLORIDE AND SODIUM CHLORIDE PRN MLS/HR: 450; 150 INJECTION, SOLUTION INTRAVENOUS at 12:55

## 2019-01-27 RX ADMIN — FAMOTIDINE SCH MG: 10 INJECTION INTRAVENOUS at 22:04

## 2019-01-27 RX ADMIN — POTASSIUM CHLORIDE AND SODIUM CHLORIDE PRN MLS/HR: 450; 150 INJECTION, SOLUTION INTRAVENOUS at 00:52

## 2019-01-27 RX ADMIN — HEPARIN SODIUM SCH UNIT: 5000 INJECTION, SOLUTION INTRAVENOUS; SUBCUTANEOUS at 22:05

## 2019-01-27 NOTE — PDOC PROGRESS REPORT
Subjective


Progress Note for:: 01/27/19


Reason For Visit: 


SMALL BOWEL OBSTRUCTION








Physical Exam


Vital Signs: 


                                        











Temp Pulse Resp BP Pulse Ox


 


 98.4 F   61   16   145/87 H  99 


 


 01/27/19 11:00  01/27/19 11:00  01/27/19 11:00  01/27/19 11:00  01/27/19 11:00








                                 Intake & Output











 01/26/19 01/27/19 01/28/19





 06:59 06:59 06:59


 


Intake Total 4550 3100 


 


Output Total 2670 6085 


 


Balance 1880 -2975 


 


Weight 85.7 kg 85.4 kg 











GI/Abdominal exam: PRESENT: soft - soft non tender passing flatus and stool





Results


Laboratory Results: 


                                        





                                 01/25/19 11:15 





                                 01/25/19 11:15 





                                        











  01/24/19 01/24/19 01/24/19





  16:03 16:03 19:45


 


Creatine Kinase  350 H   258 H


 


CK-MB (CK-2)   2.99 


 


Troponin I   < 0.012 














  01/24/19





  19:45


 


Creatine Kinase 


 


CK-MB (CK-2)  2.71


 


Troponin I  < 0.012











Impressions: 


                                        





KUB X-Ray  01/24/19 00:00


IMPRESSION:


 


Tip of the enteric tube likely in the proximal stomach. Slight


advancement may be of benefit, as above.


 


Mildly dilated air-filled loops of small bowel for which partial


or incomplete obstruction is not excluded


 


 


copyright 2011 [employer]- All Rights Reserved


 








Chest X-Ray  01/24/19 16:06


IMPRESSION:  NO ACUTE RADIOGRAPHIC FINDING IN THE CHEST.


THERE ARE DILATED LOOPS OF SMALL BOWEL IN THE UPPER ABDOMEN WITH AIR-FLUID 

LEVELS.  PATIENT IS SCHEDULED FOR CT ABDOMEN AND PELVIS.


 








Head CT  01/24/19 16:08


IMPRESSION:  NORMAL BRAIN CT WITHOUT CONTRAST.


EVIDENCE OF ACUTE STROKE: NO.


 








Abdomen/Pelvis CT  01/24/19 16:29


IMPRESSION:


1. FOCAL AREA OF MILD DIVERTICULITIS IN THE LEFT LOWER QUADRANT.  NO EVIDENCE OF

ABSCESS.


2. BOWEL MALROTATION.  THE DUODENUM EXTENDS INFERIORLY INTO THE RIGHT SIDE OF 

THE ABDOMEN INSTEAD OF CROSSING TO THE LEFT UPPER QUADRANT.  THERE IS ALSO 

DIFFUSELY DILATED SMALL BOWEL CONSISTENT WITH MECHANICAL DISTAL SMALL BOWEL 

OBSTRUCTION.  NO FOCAL TRANSITION POINT VISUALIZED.  THIS APPEARS TO BE U

NRELATED TO THE FINDINGS OF DIVERTICULITIS IN THE LEFT LOWER QUADRANT.


3. GALLSTONES.


4. HEPATIC CYSTS.


5. NO OTHER SIGNIFICANT OR ACUTE FINDING IN THE ABDOMEN OR PELVIS ON CT SCAN 

WITH IV CONTRAST.


 








Small Bowel X-Ray  01/25/19 08:00


IMPRESSION:  Dilated small bowel loops from small bowel obstruction.  Foregut 

malrotation.  Findings called to the attending surgeon, Dr. Perkins 1000 hours 

1/25/2019.


 














Assessment & Plan





- Plan Summary


Plan Summary: 





will dc ng


start clears and advance as evans


if ok, can dc home tomorrow.

## 2019-01-28 VITALS — DIASTOLIC BLOOD PRESSURE: 47 MMHG | SYSTOLIC BLOOD PRESSURE: 96 MMHG

## 2019-01-28 RX ADMIN — CEFAZOLIN SCH MLS/HR: 1 INJECTION, POWDER, FOR SOLUTION INTRAVENOUS at 00:04

## 2019-01-28 RX ADMIN — CEFAZOLIN SCH MLS/HR: 1 INJECTION, POWDER, FOR SOLUTION INTRAVENOUS at 05:58

## 2019-01-28 RX ADMIN — POTASSIUM CHLORIDE AND SODIUM CHLORIDE PRN MLS/HR: 450; 150 INJECTION, SOLUTION INTRAVENOUS at 05:58

## 2019-01-28 NOTE — DISCHARGE SUMMARY
Discharge Summary (SDC)





- Discharge


Final Diagnosis: 





small bowel obstruction, diverticulitis


Date of Surgery: 01/25/19


Discharge Date: 01/28/19


Condition: Good


Treatment or Instructions: 


slowely advance diet


regular activity


f/u with me in 5-7 days


Discharge Diet: Full Liquids - advance to soft over next 2-3 days


Discharge Activity: Activity As Tolerated, Slowly Increase Activity


Report the Following to Your Physician Immediately: Shortness of Breath, Nausea,

Vomiting, Increase in Pain

## 2019-01-28 NOTE — DISCHARGE SUMMARY E
Discharge Summary



NAME: DONNA ZAVALA

MRN:  [id number]        : 1945     AGE: 73Y

ADMITTED: 2019                 DISCHARGED: 2019



HOSPITAL COURSE:

This is a 73-year-old male who was admitted to the hospital on 2019

for abdominal pain.  A CT scan was obtained in the emergency room which

showed diverticulitis and small bowel obstruction with questionable

incomplete intestinal rotation or malrotation.  He was admitted to the

hospital and that first night treated with an NG tube without resolution

of his symptoms.  He then underwent a small bowel series which documented

a small bowel obstruction and, therefore, taken to the operating room that

evening, on the , for a diagnostic laparoscopy.  At the time of

surgery he underwent a lysis of adhesions.  Subsequent to that he was

monitored on the floor with an NG tube which rapidly decreased in its

output.  He started passing some flatus and the NG tube was removed on

postop day 2.  When he had return of bowel function and he started passing

stool he was started on a clear liquid diet which was slowly advanced to a

full liquid diet.  At the time of discharge he is afebrile with stable

vital signs.  He is tolerating a full liquid diet well and passing bowel

movements.  In addition to that he is being treated for his diverticulitis

which was noted on CT scan; this is his second episode of diverticulitis. 

He has been treated in the hospital with IV antibiotics and will be

discharged home now on ciprofloxacin 500 mg p.o. b.i.d. and Flagyl 250 mg

p.o. t.i.d. for 10 days.  He is instructed to slowly resume his normal

activity and slowly advance his diet from full liquids to soft over the

course of the next 2 days.  He will follow up with me in [facility] in

5 to 7 days after discharge.



DISCHARGE CONDITION:

Good.



DISCHARGE DISPOSITION:

To home.



DICTATING PHYSICIAN:  DARREN OBRIEN M.D.





[id number]                  DT: 2019    1323

PHY#: [id number]            DD: [id number]

ID:   [id number]           JOB#: [id number]       ACCT: [id number]



cc:DARREN OBRIEN M.D.

>

## 2019-01-28 NOTE — PDOC H&P
History of Present Illness


History of Present Illness: 


DONNA ZAVALA is a 73 year old male








Past Medical History


Cardiac Medical History: Reports: Hypertension





Past Surgical History


Past Surgical History: Reports: Orthopedic Surgery - trigger finger, 

Tonsillectomy





Social History


Smoking Status: Former Smoker





Family History


Parental Family History Reviewed: No


Children Family History Reviewed: NA


Sibling(s) Family History Reviewed.: NA





Medication/Allergy


Home Medications: 








Amlodipine Besylate [Norvasc 5 mg Tablet] 5 mg PO DAILY 01/25/19 


Aspirin [Ecotrin] 81 mg PO DAILY 01/25/19 


Atorvastatin Calcium [Lipitor 20 mg Tablet] 20 mg PO DAILY 01/25/19 


Multivitamin/Iron/Folic Acid [Centrum Adults Tablet] 1 each PO DAILY 01/25/19 








Allergies/Adverse Reactions: 


                                        





No Known Allergies Allergy (Unverified 01/24/19 15:42)


   











Physical Exam


Vital Signs: 


                                        











Temp Pulse Resp BP Pulse Ox


 


 97.5 F   76   18   150/63 H   


 


 01/24/19 15:41  01/24/19 18:50  01/24/19 15:41  01/24/19 18:50   








                                 Intake & Output











 01/23/19 01/24/19 01/25/19





 06:59 06:59 06:59


 


Intake Total   1000


 


Balance   1000


 


Weight   89.2 kg














Results


Laboratory Results: 


                                        





                                 01/24/19 16:03 





                                 01/24/19 16:03 





                                        











  01/24/19 01/24/19 01/24/19





  16:03 16:03 16:30


 


WBC  13.5 H  


 


RBC  4.70  


 


Hgb  15.3  


 


Hct  43.8  


 


MCV  93  


 


MCH  32.5  


 


MCHC  34.9  


 


RDW  13.2  


 


Plt Count  240  


 


Seg Neutrophils %  Not Reportable  


 


Lymphocytes %  Not Reportable  


 


Monocytes %  Not Reportable  


 


Eosinophils %  Not Reportable  


 


Basophils %  Not Reportable  


 


Absolute Neutrophils  Not Reportable  


 


Absolute Lymphocytes  Not Reportable  


 


Absolute Monocytes  Not Reportable  


 


Absolute Eosinophils  Not Reportable  


 


Absolute Basophils  Not Reportable  


 


Sodium   139.4 


 


Potassium   4.3 


 


Chloride   102 


 


Carbon Dioxide   27 


 


Anion Gap   10 


 


BUN   16 


 


Creatinine   0.81 


 


Est GFR ( Amer)   > 60 


 


Est GFR (Non-Af Amer)   > 60 


 


Glucose   137 H 


 


Calcium   10.7 H 


 


Total Bilirubin   0.8 


 


AST   40 


 


ALT   36 


 


Alkaline Phosphatase   142 H 


 


Total Protein   7.2 


 


Albumin   4.9 


 


Lipase   58.3 


 


Urine Color    JM


 


Urine Appearance    CLOUDY


 


Urine pH    7.0


 


Ur Specific Gravity    1.020


 


Urine Protein    100 H


 


Urine Glucose (UA)    NEGATIVE


 


Urine Ketones    80 H


 


Urine Blood    NEGATIVE


 


Urine Nitrite    NEGATIVE


 


Ur Leukocyte Esterase    NEGATIVE


 


Urine WBC (Auto)    3


 


Urine RBC (Auto)    1








                                        











  01/24/19 01/24/19





  16:03 16:03


 


Creatine Kinase  350 H 


 


CK-MB (CK-2)   2.99


 


Troponin I   < 0.012











Impressions: 


                                        





Chest X-Ray  01/24/19 16:06


IMPRESSION:  NO ACUTE RADIOGRAPHIC FINDING IN THE CHEST.


THERE ARE DILATED LOOPS OF SMALL BOWEL IN THE UPPER ABDOMEN WITH AIR-FLUID 

LEVELS.  PATIENT IS SCHEDULED FOR CT ABDOMEN AND PELVIS.


 








Head CT  01/24/19 16:08


IMPRESSION:  NORMAL BRAIN CT WITHOUT CONTRAST.


EVIDENCE OF ACUTE STROKE: NO.


 








Abdomen/Pelvis CT  01/24/19 16:29


IMPRESSION:


1. FOCAL AREA OF MILD DIVERTICULITIS IN THE LEFT LOWER QUADRANT.  NO EVIDENCE OF

ABSCESS.


2. BOWEL MALROTATION.  THE DUODENUM EXTENDS INFERIORLY INTO THE RIGHT SIDE OF 

THE ABDOMEN INSTEAD OF CROSSING TO THE LEFT UPPER QUADRANT.  THERE IS ALSO 

DIFFUSELY DILATED SMALL BOWEL CONSISTENT WITH MECHANICAL DISTAL SMALL BOWEL 

OBSTRUCTION.  NO FOCAL TRANSITION POINT VISUALIZED.  THIS APPEARS TO BE 

UNRELATED TO THE FINDINGS OF DIVERTICULITIS IN THE LEFT LOWER QUADRANT.


3. GALLSTONES.


4. HEPATIC CYSTS.


5. NO OTHER SIGNIFICANT OR ACUTE FINDING IN THE ABDOMEN OR PELVIS ON CT SCAN 

WITH IV CONTRAST.


 














Assessment & Plan





- Plan Summary


Plan Summary: 





pt to be admitted or treatment of his small bowel obstruction with ng suction, 

and his diverticulitis iw iv abx


a small bowel series will be obtained in am


close monitoring


if increase of abd pain probable diagnostic laparoscopy